# Patient Record
Sex: FEMALE | Race: WHITE
[De-identification: names, ages, dates, MRNs, and addresses within clinical notes are randomized per-mention and may not be internally consistent; named-entity substitution may affect disease eponyms.]

---

## 2018-01-24 ENCOUNTER — HOSPITAL ENCOUNTER (EMERGENCY)
Dept: HOSPITAL 89 - ER | Age: 63
Discharge: HOME | End: 2018-01-24
Payer: SELF-PAY

## 2018-01-24 VITALS — SYSTOLIC BLOOD PRESSURE: 122 MMHG | DIASTOLIC BLOOD PRESSURE: 70 MMHG

## 2018-01-24 DIAGNOSIS — E11.9: ICD-10-CM

## 2018-01-24 DIAGNOSIS — M94.0: Primary | ICD-10-CM

## 2018-01-24 LAB — PLATELET COUNT, AUTOMATED: 222 K/UL (ref 150–450)

## 2018-01-24 PROCEDURE — 71046 X-RAY EXAM CHEST 2 VIEWS: CPT

## 2018-01-24 PROCEDURE — 82435 ASSAY OF BLOOD CHLORIDE: CPT

## 2018-01-24 PROCEDURE — 84450 TRANSFERASE (AST) (SGOT): CPT

## 2018-01-24 PROCEDURE — 84460 ALANINE AMINO (ALT) (SGPT): CPT

## 2018-01-24 PROCEDURE — 96361 HYDRATE IV INFUSION ADD-ON: CPT

## 2018-01-24 PROCEDURE — 82374 ASSAY BLOOD CARBON DIOXIDE: CPT

## 2018-01-24 PROCEDURE — 84155 ASSAY OF PROTEIN SERUM: CPT

## 2018-01-24 PROCEDURE — 82565 ASSAY OF CREATININE: CPT

## 2018-01-24 PROCEDURE — 82947 ASSAY GLUCOSE BLOOD QUANT: CPT

## 2018-01-24 PROCEDURE — 99284 EMERGENCY DEPT VISIT MOD MDM: CPT

## 2018-01-24 PROCEDURE — 82247 BILIRUBIN TOTAL: CPT

## 2018-01-24 PROCEDURE — 93005 ELECTROCARDIOGRAM TRACING: CPT

## 2018-01-24 PROCEDURE — 84132 ASSAY OF SERUM POTASSIUM: CPT

## 2018-01-24 PROCEDURE — 71275 CT ANGIOGRAPHY CHEST: CPT

## 2018-01-24 PROCEDURE — 85025 COMPLETE CBC W/AUTO DIFF WBC: CPT

## 2018-01-24 PROCEDURE — 85379 FIBRIN DEGRADATION QUANT: CPT

## 2018-01-24 PROCEDURE — 82040 ASSAY OF SERUM ALBUMIN: CPT

## 2018-01-24 PROCEDURE — 96374 THER/PROPH/DIAG INJ IV PUSH: CPT

## 2018-01-24 PROCEDURE — 84075 ASSAY ALKALINE PHOSPHATASE: CPT

## 2018-01-24 PROCEDURE — 82310 ASSAY OF CALCIUM: CPT

## 2018-01-24 PROCEDURE — 84520 ASSAY OF UREA NITROGEN: CPT

## 2018-01-24 PROCEDURE — 84295 ASSAY OF SERUM SODIUM: CPT

## 2018-01-24 PROCEDURE — 36416 COLLJ CAPILLARY BLOOD SPEC: CPT

## 2018-01-24 PROCEDURE — 84484 ASSAY OF TROPONIN QUANT: CPT

## 2018-01-24 PROCEDURE — 82948 REAGENT STRIP/BLOOD GLUCOSE: CPT

## 2018-01-24 NOTE — RADIOLOGY IMAGING REPORT
[place] [facility] [place] 

 

PATIENT NAME: Rosalia King

: 1955

MR: [id number]

V: [id number]

EXAM DATE: 

ORDERING PHYSICIAN: IBRAHIMA JAQUEZ

TECHNOLOGIST: 

 

Location: [facility] [place]

Patient: Rosalia King

: 1955

MRN: [id number]

Visit/Account:[id number]

Date of Sevice:  2018

 

ACCESSION #: [id number]

 

CHEST PA AND LAT

 

INDICATION: Chest pain

 

COMPARISON: 2017 radiograph and CT 2017, abdomen and pelvis CT 2017

 

FINDINGS:   The cardiac silhouette is normal in size. No pneumothorax. Unchanged right hemidiaphragm 
elevation. No pleural fluid seen on lateral view. Small curvilinear new opacity projects in the left 
lower lobe seen on frontal and lateral views.. Additional ill-defined 1.9 cm patchy opacity at the le
ft lateral lung base is in region of apparent rounded atelectasis seen on the comparison abdomen and 
pelvis CT. This appears increased in size and conspicuity compared to prior 2017 radiograph.

 

No acute osseous abnormality.

 

IMPRESSION:

1. New curvilinear left basilar scarring versus subsegmental atelectasis.

 

2. 1.9 cm vague left lateral basilar opacity has increased in conspicuity compared to prior chest rad
iograph. This may be increased in size compared to the abdomen and pelvis CT from 2017 allowing 
for technique differences which showed potential rounded atelectasis in the left lower lung.

 

A follow-up chest CT may be warranted to ensure this represents rounded atelectasis and exclude the p
ossibility of a suspicious pulmonary nodule in this area given change in radiographic appearance of t
his finding.

 

Report Dictated By: Parrish Mueller MD at 2018 4:15 PM

 

Report E-Signed By: Parrish Mueller MD  at 2018 4:23 PM

 

WSN:[id number]

## 2018-01-24 NOTE — ER REPORT
History and Physical


Time Seen By MD:  15:11


Hx. of Stated Complaint:  


Pt reporting sudden chest pain that occured at rest two nights ago.  Patient 


reporting stabbing left chest pain.


HPI/ROS


CHIEF COMPLAINT: Left-sided chest pain





HISTORY OF PRESENT ILLNESS: 63-year-old female patient presents to emergency 

room with complaint of left-sided chest pain. Patient states this been going on 

for the past 2 days. She states that the pain seems to be present all the time. 

She states there is nothing seems to make the pain worse. She states that she 

has had some improvement in discomfort with standing. States she also has 

discomfort to the right side of the abdomen where she has drains are placed for 

2 fistulas. She denies having any fevers, chills, nausea, vomiting or diarrhea. 

She states she is taken some oxycodone for this with no improvement in her 

comfort. She states she does have an appointment with the surgeon, who placed 

the drains, tomorrow in [place].





REVIEW OF SYSTEMS:


Respiratory: No cough, no dyspnea.


Cardiovascular: As noted above


Gastrointestinal: No vomiting, no abdominal pain.


Musculoskeletal: No back pain.


Allergies:  


Coded Allergies:  


     No Known Drug Allergies (Unverified , 11/7/17)


Home Meds


Active Scripts


Oxycodone HCl (Oxaydo) 5 Mg Tablet.orl, 1 TAB PO Q4-6H Y for PAIN, #12 TAB


   Prov:IBRAHIMA JAQUEZ [facility]         1/24/18


Meclizine Hcl (MECLIZINE HCL) 25 Mg Tablet, 25 MG PO Q6H Y for DIZZINESS, #30 

TAB


   Prov:IBRAHIMA JAQUEZ [facility]         1/14/17


Reported Medications


Omeprazole (OMEPRAZOLE) 20 Mg Tablet.dr, 20 MG PO BID, TAB


   1/24/18


Atenolol (ATENOLOL) 50 Mg Tablet, 1 TAB PO BID, TAB


   1/24/18


Cetirizine HCl (24Hour Allergy) 10 Mg Tablet


   11/7/17


Simvastatin (SIMVASTATIN) 10 Mg Tablet, 10 MG PO HS, TAB


   3/18/17


Famotidine (FAMOTIDINE) 20 Mg Tablet, 20 MG PO QHS, TAB


   3/18/17


Citalopram Hydrobromide (CITALOPRAM HBR) 20 Mg Tablet, 20 MG PO QDAY, #5 TAB


   3/18/17


Lidocaine HCl (Aspercreme) 4 % Cream..g., TOP


   3/18/17


Acetaminophen (ACETAMINOPHEN) 500 Mg/5 Ml Liquid, 1000 MG PO Q4-6H, #240 ML


   3/18/17


Lisinopril (LISINOPRIL) 20 Mg Tablet, 40 PO QDAY, TAB


   10/7/15


Aspirin (ASPIRIN) 81 Mg Tab.chew, 81 MG PO QDAY, TAB.CHEW


   TAKE 1 TABLET BY MOUTH EVERY DAY


   10/2/14


Metformin Hcl (METFORMIN HCL) 1,000 Mg Tablet, 1 TAB PO BID


   TAKE ONE TABLET BY MOUTH TWO TIMES A DAY


   10/2/14


Buspirone Hcl (BUSPIRONE HCL) 15 Mg Tablet, 15 MG PO BID, #10 TAB


   TAKE 1 TABLET BY MOUTH TWICE A DAY


   10/2/14


Discontinued Reported Medications


 Amoxicillin 500 Mg Tab (AMOXICILLIN 500 MG TAB) 500 Mg Tablet, 1 TAB PO Q8H, 

TAB


   11/7/17


Calcium Carbonate (TUMS) 200 Mg Tab.chew, 200 MG PO, TAB.CHEW


   3/18/17


Atenolol (ATENOLOL) 100 Mg Tablet, 1 TAB PO QDAY, TAB


   3/18/17


Pantoprazole Sodium (PANTOPRAZOLE SODIUM) 40 Mg Tablet.dr, 40 MG PO BID, TAB.SR


   3/18/17


Pregabalin (LYRICA) 100 Mg Capsule, 100 MG PO BID, CAPSULE


   3/18/17


Oxycodone Hcl (OXYCONTIN) 10 Mg Tab.er.12h, 5 MG PO, TAB


   3/18/17


Chlorpheniramine Maleate (CHLOR-TRIMETON ALLERGY) 12 Mg Tablet.er, 4 MG PO DAILY


   10/2/14


Atenolol (ATENOLOL) 100 Mg Tablet, 1 TAB PO QDAY, TAB


   TAKE ONE TABLET BY MOUTH ONCE A DAY


   10/2/14


Past Medical/Surgical History


Patient has a past medical history of hypertension, hyperlipidemia, pneumonia, 

hiatal hernia, frequent UTI, ankle fracture, back pain, type 2 diabetes, anemia.


Patient has a surgical history of mass removal, fistula, cholecystectomy, 

hysterectomy, wisdom teeth surgery.


Reviewed Nurses Notes:  Yes


Hx Smoking:  No


Smoking Status:  Never Smoker


Exposure to Second Hand Smoke?:  No


Constitutional





Vital Sign - Last 24 Hours








 1/24/18 1/24/18 1/24/18 1/24/18





 15:05 15:05 15:05 15:06


 


Temp  98.7  


 


Pulse  73  


 


Resp  18  


 


B/P (MAP)  124/65 124/65 (84) 


 


Pulse Ox  90  89


 


O2 Delivery  Nasal Cannula  


 


O2 Flow Rate 1.0   


 


    





 1/24/18 1/24/18 1/24/18 1/24/18





 15:11 15:16 15:21 15:26


 


Pulse 71 71 71 69


 


Pulse Ox 91 94 93 93





 1/24/18 1/24/18 1/24/18 1/24/18





 15:30 15:31 15:36 15:41


 


Pulse  69 70 68


 


B/P (MAP) ???/??? (1665)   


 


Pulse Ox  94 91 93





 1/24/18 1/24/18 1/24/18 1/24/18





 15:42 15:46 15:51 15:56


 


Pulse  68 66 67


 


Resp  23 19 18


 


B/P (MAP) 115/63 (80)   


 


Pulse Ox  93 94 94





 1/24/18 1/24/18 1/24/18 1/24/18





 16:08 16:11 16:16 16:26


 


Pulse  69 67 67


 


Resp  19 22 19


 


B/P (MAP) 124/67 (86)   


 


Pulse Ox  94 94 94





 1/24/18 1/24/18 1/24/18 1/24/18





 16:36 16:41 16:51 16:56


 


Pulse 71 69 73 69


 


Resp 20 20 16 31


 


Pulse Ox 94 94 94 





 1/24/18 1/24/18 1/24/18 1/24/18





 17:01 17:06 17:11 17:16


 


Pulse 68 69 67 68


 


Resp 30 22 22 13





 1/24/18 1/24/18 1/24/18 1/24/18





 17:21 17:26 18:00 18:06


 


Pulse 66 69  76


 


Resp 21 28  26


 


B/P (MAP)   116/89 (98) 


 


Pulse Ox    91





 1/24/18 1/24/18 1/24/18 1/24/18





 18:11 18:16 18:21 18:26


 


Pulse 70 69 68 68


 


Resp 22 21 23 23


 


Pulse Ox 92 92 93 92





 1/24/18 1/24/18 1/24/18 1/24/18





 18:30 18:31 18:36 18:41


 


Pulse  72 69 71


 


Resp  21 20 23


 


B/P (MAP) 103/54 (70)   


 


Pulse Ox  93 90 90





 1/24/18 1/24/18 1/24/18 1/24/18





 18:46 18:51 18:56 19:00


 


Pulse 72 68 71 


 


Resp 24 19 34 


 


B/P (MAP)    122/70 (87)


 


Pulse Ox 91 92 92 





 1/24/18 1/24/18 1/24/18 





 19:01 19:06 19:11 


 


Pulse 80 73 71 


 


Resp 17  16 


 


Pulse Ox 93  94 














Intake and Output   


 


 1/24/18 1/24/18 1/25/18





 15:00 23:00 07:00


 


Intake Total  500 ml 


 


Balance  500 ml 








Physical Exam


  General Appearance: The patient is alert, has no immediate need for airway 

protection and no current signs of toxicity.


Respiratory: Chest is tender on the left side of the chest, lungs are clear to 

auscultation.


Cardiac: regular rate and rhythm


Gastrointestinal: Abdomen is soft and non tender, no masses, bowel sounds 

normal.


Musculoskeletal:  Neck: Neck is supple and non tender.


   Extremities have full range of motion and are non tender.


Skin: No rashes or lesions.





DIFFERENTIAL DIAGNOSIS: After history and physical exam differential diagnosis 

was considered for chest pain including but not limited to myocardial ischemia, 

pericarditis pulmonary embolus, chest wall pain, pleural inflammation and 

pulmonary infectious causes.





Medical Decision Making


Data Points


Result Diagram:  


1/24/18 1540                                                                   

             1/24/18 1540





Laboratory





Hematology








Test


  1/24/18


15:40 1/24/18


17:49


 


Red Blood Count


  4.38 M/uL


(4.17-5.56) 


 


 


Mean Corpuscular Volume


  81.9 fL


(80.0-96.0) 


 


 


Mean Corpuscular Hemoglobin


  26.7 pg


(26.0-33.0) 


 


 


Mean Corpuscular Hemoglobin


Concent 32.6 g/dL


(32.0-36.0) 


 


 


Red Cell Distribution Width


  15.8 %


(11.5-14.5) 


 


 


Mean Platelet Volume


  8.5 fL


(7.2-11.1) 


 


 


Neutrophils (%) (Auto)


  63.2 %


(39.4-72.5) 


 


 


Lymphocytes (%) (Auto)


  25.6 %


(17.6-49.6) 


 


 


Monocytes (%) (Auto)


  8.2 %


(4.1-12.4) 


 


 


Eosinophils (%) (Auto)


  2.3 %


(0.4-6.7) 


 


 


Basophils (%) (Auto)


  0.7 %


(0.3-1.4) 


 


 


Nucleated RBC Relative Count


(auto) 0.0 /100WBC 


  


 


 


Neutrophils # (Auto)


  4.7 K/uL


(2.0-7.4) 


 


 


Lymphocytes # (Auto)


  1.9 K/uL


(1.3-3.6) 


 


 


Monocytes # (Auto)


  0.6 K/uL


(0.3-1.0) 


 


 


Eosinophils # (Auto)


  0.2 K/uL


(0.0-0.5) 


 


 


Basophils # (Auto)


  0.1 K/uL


(0.0-0.1) 


 


 


Nucleated RBC Absolute Count


(auto) 0.00 K/uL 


  


 


 


D-Dimer Quantitative (PE/DVT)


  6.08 ug/ml


(0-0.50) 


 


 


Sodium Level


  137 mmol/L


(137-145) 


 


 


Potassium Level


  5.8 mmol/L


(3.5-5.0) 


 


 


Chloride Level


  105 mmol/L


() 


 


 


Carbon Dioxide Level


  23 mmol/L


(22-31) 


 


 


Blood Urea Nitrogen


  36 mg/dl


(7-18) 


 


 


Creatinine


  0.80 mg/dl


(0.52-1.04) 


 


 


Glomerular Filtration Rate


Calc > 60.0 


  


 


 


Random Glucose


  117 mg/dl


() 


 


 


Calcium Level


  11.5 mg/dl


(8.4-10.2) 


 


 


Total Bilirubin


  0.4 mg/dl


(0.2-1.3) 


 


 


Aspartate Amino Transf


(AST/SGOT) 63 U/L (0-35) 


  


 


 


Alanine Aminotransferase


(ALT/SGPT) 87 U/L (0-56) 


  


 


 


Alkaline Phosphatase


  223 U/L


(0-126) 


 


 


Troponin I < 0.012 ng/ml  


 


Total Protein


  7.4 gm/dl


(6.3-8.2) 


 


 


Albumin


  3.7 g/dl


(3.5-5.0) 


 


 


Whole Blood Glucose


  


  100 mg/DL


()








Chemistry








Test


  1/24/18


15:40 1/24/18


17:49


 


White Blood Count


  7.4 k/uL


(4.5-11.0) 


 


 


Red Blood Count


  4.38 M/uL


(4.17-5.56) 


 


 


Hemoglobin


  11.7 g/dL


(12.0-16.0) 


 


 


Hematocrit


  35.9 %


(34.0-47.0) 


 


 


Mean Corpuscular Volume


  81.9 fL


(80.0-96.0) 


 


 


Mean Corpuscular Hemoglobin


  26.7 pg


(26.0-33.0) 


 


 


Mean Corpuscular Hemoglobin


Concent 32.6 g/dL


(32.0-36.0) 


 


 


Red Cell Distribution Width


  15.8 %


(11.5-14.5) 


 


 


Platelet Count


  222 K/uL


(150-450) 


 


 


Mean Platelet Volume


  8.5 fL


(7.2-11.1) 


 


 


Neutrophils (%) (Auto)


  63.2 %


(39.4-72.5) 


 


 


Lymphocytes (%) (Auto)


  25.6 %


(17.6-49.6) 


 


 


Monocytes (%) (Auto)


  8.2 %


(4.1-12.4) 


 


 


Eosinophils (%) (Auto)


  2.3 %


(0.4-6.7) 


 


 


Basophils (%) (Auto)


  0.7 %


(0.3-1.4) 


 


 


Nucleated RBC Relative Count


(auto) 0.0 /100WBC 


  


 


 


Neutrophils # (Auto)


  4.7 K/uL


(2.0-7.4) 


 


 


Lymphocytes # (Auto)


  1.9 K/uL


(1.3-3.6) 


 


 


Monocytes # (Auto)


  0.6 K/uL


(0.3-1.0) 


 


 


Eosinophils # (Auto)


  0.2 K/uL


(0.0-0.5) 


 


 


Basophils # (Auto)


  0.1 K/uL


(0.0-0.1) 


 


 


Nucleated RBC Absolute Count


(auto) 0.00 K/uL 


  


 


 


D-Dimer Quantitative (PE/DVT)


  6.08 ug/ml


(0-0.50) 


 


 


Glomerular Filtration Rate


Calc > 60.0 


  


 


 


Calcium Level


  11.5 mg/dl


(8.4-10.2) 


 


 


Total Bilirubin


  0.4 mg/dl


(0.2-1.3) 


 


 


Aspartate Amino Transf


(AST/SGOT) 63 U/L (0-35) 


  


 


 


Alanine Aminotransferase


(ALT/SGPT) 87 U/L (0-56) 


  


 


 


Alkaline Phosphatase


  223 U/L


(0-126) 


 


 


Troponin I < 0.012 ng/ml  


 


Total Protein


  7.4 gm/dl


(6.3-8.2) 


 


 


Albumin


  3.7 g/dl


(3.5-5.0) 


 


 


Whole Blood Glucose


  


  100 mg/DL


()








Coagulation








Test


  1/24/18


15:40


 


D-Dimer Quantitative (PE/DVT) 6.08 ug/ml 











EKG/Imaging


EKG Interpretation


12 lead EKG:


      Rhythm: normal sinus rhythm with a ventricular rate of 70 bpm.


      Axis: normal 


      QRS: normal


      ST segments: normal


Imaging


EXAMINATION: CT CHEST PULMONARY ANGIOGRAM


 


COMPARISON: Chest x-ray same day and earlier. Chest CT 1/14/2017. Abdomen CT 11/ 7/2017.


 


HISTORY: Chest and back pain. Possible enlarging lung nodule.


 


PROCEDURE: Pulmonary arterial phase imaging of the chest with 75 mL intravenous 

Isovue 370. Reconstruction of the source data set includes multiplanar 2D in 

the sagittal and coronal planes, and 3D reconstructed coronal slab MIP series.


 


One of the following dose optimization techniques was utilized in the 

performance of this exam: Automated exposure control; adjustment of the mA and/

or kV according to the patient's size; or use of an iterative  reconstruction 

technique.  Specific details can be referenced in the facility's radiology CT 

exam operational policy.


 


FINDINGS:


Pulmonary vasculature: There is good contrast opacification of the pulmonary 

arterial system.  No pulmonary embolism. Main pulmonary artery size is normal.


 


Cardiac and mediastinum: Cardiac chamber size is within normal limits. No 

pericardial effusion. Probable mild coronary calcifications. No thoracic aortic 

aneurysm or dissection. No thoracic lymph node enlargement. Probable 

multinodular thyroid.


 


Lungs and pleura: Low lung volumes. Trace left pleural effusion. Left lung base 

increased density is favored to be due to atelectasis. The previously described 

region of rounded atelectasis is partially obscured by the surrounding regions 

of segmental atelectasis. No definite new or enlarging consolidation or nodule 

is otherwise identified. No pneumothorax or edema.


 


Airways: Airways are patent. Mild tracheobronchomalacia.


 


Upper abdomen: Mild gastric distention in the visualized upper abdomen.


 


Osseous structures: No acute changes.


 


IMPRESSION:


 


1. No pulmonary embolism.


2. Trace left pleural effusion with increased left lung base atelectasis.


3. No findings of acute cardiopulmonary disease are otherwise identified.


 


Report Dictated By: Simon Graves MD at 1/24/2018 6:29 PM


 


Report E-Signed By: Simon Graves MD  at 1/24/2018 6:41 PM





CHEST PA AND LAT


 


INDICATION: Chest pain


 


COMPARISON: 11/7/2017 radiograph and CT January 14, 2017, abdomen and pelvis CT 

11/7/2017


 


FINDINGS:   The cardiac silhouette is normal in size. No pneumothorax. 

Unchanged right hemidiaphragm elevation. No pleural fluid seen on lateral view. 

Small curvilinear new opacity projects in the left lower lobe seen on frontal 

and lateral views.. Additional ill-defined 1.9 cm patchy opacity at the left 

lateral lung base is in region of apparent rounded atelectasis seen on the 

comparison abdomen and pelvis CT. This appears increased in size and 

conspicuity compared to prior 11/7/2017 radiograph.


 


No acute osseous abnormality.


 


IMPRESSION:


1. New curvilinear left basilar scarring versus subsegmental atelectasis.


 


2. 1.9 cm vague left lateral basilar opacity has increased in conspicuity 

compared to prior chest radiograph. This may be increased in size compared to 

the abdomen and pelvis CT from 11/7/2017 allowing for technique differences 

which showed potential rounded atelectasis in the left lower lung.


 


A follow-up chest CT may be warranted to ensure this represents rounded 

atelectasis and exclude the possibility of a suspicious pulmonary nodule in 

this area given change in radiographic appearance of this finding.


 


Report Dictated By: Parrish Mueller MD at 1/24/2018 4:15 PM


 


Report E-Signed By: Parrish Mueller MD  at 1/24/2018 4:23 PM





ED Course/Re-evaluation


ED Course


Patient was medicated exam room, history and physical were obtained. 

Differential diagnoses were considered. On examination patient did have some 

tenderness to the left side of her chest. An EKG, troponin, chest x-ray, CBC, 

CMP, d-dimer were done. EKG showed a normal sinus rhythm, chest x-ray showed a 

nodule which appeared to be larger than when he was previously seen in an 

abdomen and pelvis CT scan. Recommended a CT scan for further evaluation. CBC 

was unremarkable, patient did have a d-dimer that was 6. Patient had an 

elevated potassium of 5.8. A CT pulmonary angiogram was done. The results were 

negative for any acute findings, as well as pulmonary emboli. I discussed 

findings with the patient and her . I believe the patient is likely 

having chest wall pain, likely a costochondritis as result of movement guarding 

the drain which she has for her fistulas. Patient does have an appointment 

tomorrow with her surgeon for follow-up. We will go ahead and discharge patient 

home at this time. She is follow-up with her surgeon. She is return to the 

emergency room if condition worsens. Patient verbalized understanding and 

agreement.


Decision to Disposition Date:  Jan 24, 2018


Decision to Disposition Time:  19:02





Depart


Departure


Latest Vital Signs





Vital Signs








  Date Time  Temp Pulse Resp B/P (MAP) Pulse Ox O2 Delivery O2 Flow Rate FiO2


 


1/24/18 19:11  71 16  94   


 


1/24/18 19:00    122/70 (87)    


 


1/24/18 15:05 98.7     Nasal Cannula  


 


1/24/18 15:05       1.0 








Impression:  


 Primary Impression:  


 Chest pain


 Additional Impression:  


 Costochondritis, acute


Condition:  Improved


Disposition:  HOME OR SELF-CARE


Referrals:  


BETHANIE SIMMONS MD (PCP)


New Scripts


Oxycodone HCl (Oxaydo) 5 Mg Tablet.orl


1 TAB PO Q4-6H Y for PAIN, #12 TAB


   Prov: IBRAHIMA JAQUEZ         1/24/18


Patient Instructions:  Chest Pain (ED)





Additional Instructions:  


Limit activity by pain.


Follow up with your surgeon tomorrow as scheduled.


Limit activity by pain.


Take Ibuprofen as needed for pain in addition to the pain medication.


Increase fluid intake.


Return to the ER if condition worsens.





Problem Qualifiers








 Primary Impression:  


 Chest pain


 Chest pain type:  other chest pain  Qualified Codes:  R07.89 - Other chest pain








IBRAHIMA JAQUEZ Jan 24, 2018 15:11

## 2018-01-24 NOTE — RADIOLOGY IMAGING REPORT
FACILITY: [facility] [place] 

 

PATIENT NAME: Rosalia King

: 1955

MR: [id number]

V: [id number]

EXAM DATE: 

ORDERING PHYSICIAN: IBRAHIMA JAQUEZ

TECHNOLOGIST: 

 

[place] [facility] [place]

Patient: Rosalia King

: 1955

MRN: [id number]

Visit/Account:[id number]

Date of Sevice:  2018

 

ACCESSION #: [id number]

 

EXAMINATION: CT CHEST PULMONARY ANGIOGRAM

 

COMPARISON: Chest x-ray same day and earlier. Chest CT 2017. Abdomen CT 2017.

 

HISTORY: Chest and back pain. Possible enlarging lung nodule.

 

PROCEDURE: Pulmonary arterial phase imaging of the chest with 75 mL intravenous Isovue 370. Reconstru
ction of the source data set includes multiplanar 2D in the sagittal and coronal planes, and 3D recon
structed coronal slab MIP series.

 

One of the following dose optimization techniques was utilized in the performance of this exam: Autom
ated exposure control; adjustment of the mA and/or kV according to the patient's size; or use of an i
terative  reconstruction technique.  Specific details can be referenced in the facility's radiology C
T exam operational policy.

 

FINDINGS:

Pulmonary vasculature: There is good contrast opacification of the pulmonary arterial system.  No pul
monary embolism. Main pulmonary artery size is normal.

 

Cardiac and mediastinum: Cardiac chamber size is within normal limits. No pericardial effusion. Proba
ble mild coronary calcifications. No thoracic aortic aneurysm or dissection. No thoracic lymph node e
nlargement. Probable multinodular thyroid.

 

Lungs and pleura: Low lung volumes. Trace left pleural effusion. Left lung base increased density is 
favored to be due to atelectasis. The previously described region of rounded atelectasis is partially
 obscured by the surrounding regions of segmental atelectasis. No definite new or enlarging consolida
tion or nodule is otherwise identified. No pneumothorax or edema.

 

Airways: Airways are patent. Mild tracheobronchomalacia.

 

Upper abdomen: Mild gastric distention in the visualized upper abdomen.

 

Osseous structures: No acute changes.

 

IMPRESSION:

 

1. No pulmonary embolism.

2. Trace left pleural effusion with increased left lung base atelectasis.

3. No findings of acute cardiopulmonary disease are otherwise identified.

 

Report Dictated By: Simon Graves MD at 2018 6:29 PM

 

Report E-Signed By: Simon Graves MD  at 2018 6:41 PM

 

WSN:[id number]

## 2018-01-24 NOTE — EKG
FACILITY: [facility] 

 

PATIENT NAME: SLADE AGUILAR

: [id number]

MR: [id number]

V: [id number]

EXAM DATE: 

ORDERING PHYSICIAN: IBRAHIMA JAQUEZ

TECHNOLOGIST: TIP

 

Test Reason : CP

Blood Pressure : ***/*** mmHG

Vent. Rate : 070 BPM     Atrial Rate : 070 BPM

   P-R Int : 136 ms          QRS Dur : 076 ms

    QT Int : 378 ms       P-R-T Axes : 016 -01 019 degrees

   QTc Int : 408 ms

 

Normal sinus rhythm

Voltage criteria for left ventricular hypertrophy

Abnormal ECG

When compared with ECG of 2017 08:47,

Previous ECG has undetermined rhythm, needs review

Confirmed by NILES RODARTE ([id number]) on 2018 5:32:29 PM

 

Referred By:  BISHOP           Confirmed By:NILES RODARTE

## 2018-04-12 ENCOUNTER — HOSPITAL ENCOUNTER (OUTPATIENT)
Dept: HOSPITAL 89 - AMB | Age: 63
End: 2018-04-12
Payer: SELF-PAY

## 2018-04-12 ENCOUNTER — HOSPITAL ENCOUNTER (EMERGENCY)
Dept: HOSPITAL 89 - ER | Age: 63
Discharge: ANOTHER HOSPITAL | End: 2018-04-12
Payer: SELF-PAY

## 2018-04-12 VITALS — DIASTOLIC BLOOD PRESSURE: 70 MMHG | SYSTOLIC BLOOD PRESSURE: 140 MMHG

## 2018-04-12 DIAGNOSIS — R10.9: Primary | ICD-10-CM

## 2018-04-12 DIAGNOSIS — T81.4XXA: ICD-10-CM

## 2018-04-12 DIAGNOSIS — L02.211: Primary | ICD-10-CM

## 2018-04-12 LAB
INR PPP: 0.98
PLATELET COUNT, AUTOMATED: 175 K/UL (ref 150–450)

## 2018-04-12 PROCEDURE — 87040 BLOOD CULTURE FOR BACTERIA: CPT

## 2018-04-12 PROCEDURE — 96365 THER/PROPH/DIAG IV INF INIT: CPT

## 2018-04-12 PROCEDURE — 71046 X-RAY EXAM CHEST 2 VIEWS: CPT

## 2018-04-12 PROCEDURE — 74177 CT ABD & PELVIS W/CONTRAST: CPT

## 2018-04-12 PROCEDURE — 85610 PROTHROMBIN TIME: CPT

## 2018-04-12 PROCEDURE — 84075 ASSAY ALKALINE PHOSPHATASE: CPT

## 2018-04-12 PROCEDURE — 84295 ASSAY OF SERUM SODIUM: CPT

## 2018-04-12 PROCEDURE — 83690 ASSAY OF LIPASE: CPT

## 2018-04-12 PROCEDURE — 84520 ASSAY OF UREA NITROGEN: CPT

## 2018-04-12 PROCEDURE — 84155 ASSAY OF PROTEIN SERUM: CPT

## 2018-04-12 PROCEDURE — 82040 ASSAY OF SERUM ALBUMIN: CPT

## 2018-04-12 PROCEDURE — 82948 REAGENT STRIP/BLOOD GLUCOSE: CPT

## 2018-04-12 PROCEDURE — 85730 THROMBOPLASTIN TIME PARTIAL: CPT

## 2018-04-12 PROCEDURE — 82435 ASSAY OF BLOOD CHLORIDE: CPT

## 2018-04-12 PROCEDURE — 96375 TX/PRO/DX INJ NEW DRUG ADDON: CPT

## 2018-04-12 PROCEDURE — 82247 BILIRUBIN TOTAL: CPT

## 2018-04-12 PROCEDURE — 82310 ASSAY OF CALCIUM: CPT

## 2018-04-12 PROCEDURE — 81001 URINALYSIS AUTO W/SCOPE: CPT

## 2018-04-12 PROCEDURE — 99284 EMERGENCY DEPT VISIT MOD MDM: CPT

## 2018-04-12 PROCEDURE — 82947 ASSAY GLUCOSE BLOOD QUANT: CPT

## 2018-04-12 PROCEDURE — 36416 COLLJ CAPILLARY BLOOD SPEC: CPT

## 2018-04-12 PROCEDURE — 96361 HYDRATE IV INFUSION ADD-ON: CPT

## 2018-04-12 PROCEDURE — 82565 ASSAY OF CREATININE: CPT

## 2018-04-12 PROCEDURE — 84460 ALANINE AMINO (ALT) (SGPT): CPT

## 2018-04-12 PROCEDURE — 85025 COMPLETE CBC W/AUTO DIFF WBC: CPT

## 2018-04-12 PROCEDURE — 82374 ASSAY BLOOD CARBON DIOXIDE: CPT

## 2018-04-12 PROCEDURE — 84132 ASSAY OF SERUM POTASSIUM: CPT

## 2018-04-12 PROCEDURE — 80320 DRUG SCREEN QUANTALCOHOLS: CPT

## 2018-04-12 PROCEDURE — 84450 TRANSFERASE (AST) (SGOT): CPT

## 2018-04-12 NOTE — RADIOLOGY IMAGING REPORT
FACILITY: [facility] 

 

PATIENT NAME: Rosalia King

: 1955

MR: [id number]

V: [id number]

EXAM DATE: 

ORDERING PHYSICIAN: PHI MONTEZ

TECHNOLOGIST: 

 

Location: [facility] [place]

Patient: Rosalia King

: 1955

MRN: [id number]

Visit/Account:[id number]

Date of Sevice:  2018

 

ACCESSION #: [id number]

 

ABDOMEN/PELVIS WITH CONTRAST

 

HISTORY:  Pain, infection

 

TECHNIQUE: Following administration of IV contrast contiguous axial images acquired through the abdom
en/pelvis.  Coronal and sagittal reformatting also performed. Dose Lowering Technique

 

One of the following dose optimization techniques was utilized in the performance of this exam: Autom
ated exposure control; adjustment of the mA and/or kV according to the patient's size; or use of an i
terative  reconstruction technique.  Specific details can be referenced in the facility's radiology C
T exam operational policy.

 

 

 

CONTRAST:  75 mL Isovue-370

 

COMPARISON:  2017

 

FINDINGS:

 

Visualized lung bases:  Small calcified granuloma right upper lobe.  Pleural thickening and linear sc
arring in the left lower lobe appears similar to the prior study.  Previously noted round subpleural 
density in the posterior lateral left lower lobe minimally decreased in size now measuring 1.6 cm as 
opposed 1.7 cm.

 

 

Hepatobiliary:  Nodular appearance to the hepatic surface consistent with cirrhosis.  Postsurgical ch
anges from a cholecystectomy

 

 

Spleen:  Splenomegaly Spleen measures 14.8 cm in length.  There is a tiny accessory splenule

 

Adrenals:  Negative.

 

Pancreas:  Tiny calcifications in the head of the pancreas

 

Kidneys ureters or bladder: 3 mm nonobstructing calculus lower pole calyx of the left kidney

 

Genitalia:  Hysterectomy

 

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  The afferent 
and efferent  loops do not appear dilated.  There is a small collection of gas projecting just medial
 to the anterior portion of the gastric fundus tracking towards the proximal portion of the afferent 
loop.  There is a percutaneous drainage catheter extending from the anterior lateral right-sided abdo
blayne wall distal tip terminating just anterior to the right lobe the liver.  There does appear to be
 a small soft tissue tract extending towards this air collection from the distal tip of the drainage 
catheter.  The previously noted thick-walled intermediate density fluid collection containing small f
ocus of gas along the anterior right abdominal wall appears mostly resolved.  Previously described ha
zy mesentery surrounding several loops of small bowel in the left mid to upper abdomen has resolved. 
 There is diastases of the rectus sheath with anterior protrusion of nonobstructed large and small angela
wel.  There are Two small ventral hernias in the midline of the upper abdomen containing knuckles of 
nonobstructed transverse colon.

 

Vessels/spaces/nodes:  There are numerous small celiac lymph nodes similar to the prior study which a
re likely reactive.  There are multiple retroperitoneal lymph nodes also appear similar to the prior 
study.  A representative aortocaval lymph node measures 1.8 x 1 cm

 

Bones/soft tissues:  Please see above discussion no aggressive appearing bone lesions are seen

 

Additional findings:  None pertinent.

 

IMPRESSION:

 

There are postsurgical changes from partial gastric resection with a Billroth II reconstruction.  The
re is a small collection of gas projecting just medial to the anterior portion of the gastric fundus 
tracking towards the proximal portion of the afferent loop.  There is a percutaneous drainage cathete
r extending from the anterior right lateral abdominal wall with the distal tip terminating just anter
ior to the right lobe the liver.  There appears to be a soft tissue tract extending towards this air 
collection from the distal tip of the drainage catheter.

 

The previous thick-walled intermediate density fluid collection containing air along the anterior rig
ht abdominal wall appears mostly resolved

 

Small vent al hernias containing nonobstructed large bowel

 

Diastases of the rectus sheath with anterior protrusion of the nonobstructed large and small bowel

 

Retroperitoneal adenopathy appears stable

 

SplenomegalyNodular appearance to the hepatic surface consistent with cirrhosis

 

Left basilar lung scarring.

 

Report Dictated By: Stephanie Perrin MD at 2018 11:52 AM

 

Report E-Signed By: Stephanie Perrin MD  at 2018 12:22 PM

 

WSN:[id number]

## 2018-04-12 NOTE — RADIOLOGY IMAGING REPORT
[place] [facility] [place] 

 

PATIENT NAME: Rosalia King

: 1955

MR: [id number]

V: [id number]

EXAM DATE: 

ORDERING PHYSICIAN: PHI MONTEZ

TECHNOLOGIST: 

 

[place] [facility] [place]: Rosalia King

: 1955

MRN: [id number]

Visit/Account:[id number]

Date of Sevice:  2018

 

ACCESSION #: [id number]

 

Exam type: CHEST PA AND LAT

 

History: pain

 

Comparison: 2018.

 

Findings:

 

Again noted is chronic elevation right hemidiaphragm.  Chronic linear stranding left lung base consis
tent with scarring.  There is mild fullness of the pulmonary vascular markings.  Chronic blunting of 
left costophrenic angle.  The cardiac silhouette is normal in size.  There are mild spondylotic churchill
es of the thoracic spine

 

IMPRESSION:

 

1.  Chronic scarring left lung base

 

Prominence of the pulmonary vascular markings which could represent mild pulmonary edema.  Clinical c
orrelation needed

 

Report Dictated By: Stephanie Perrin MD at 2018 11:47 AM

 

Report E-Signed By: Stephanie Perrin MD  at 2018 11:50 AM

 

WSN:ETHEL

## 2018-04-12 NOTE — ER REPORT
History and Physical


Time Seen By MD:  11:20


Hx. of Stated Complaint:  


PT HAS FISTULA DRAIN PUT IN DECEMBER, SKIN HAS BEEN VERY RAW/PAINFUL


HPI/ROS


CHIEF COMPLAINT: Infected drink





HISTORY OF PRESENT ILLNESS: 63 erythema comes emergency Department today with a 

significant past medical and surgical history consistent of a fistula formation 

status post abdominal surgery removal of a benign tumor resulting in a fistula 

which she currently has a drain recently the drain is become purulent with 

abnormal material from the drainage site of the drainage the bag does have some 

material in it but there is seepage drainage and around the site with obvious 

dermatological changes in and around for the 2 places against the abdominal 

wall patient describing pain in that area nausea without vomiting no diarrhea 

denies any fever or chills patient has no additional place this time





REVIEW OF SYSTEMS:


Respiratory: No cough, no dyspnea.


Cardiovascular: No chest pain, no palpitations.


Gastrointestinal: Nausea abdominal pain purulent drainage from fistula site


Musculoskeletal: No back pain.


Remainder of the 14 system rev:  Yes


Allergies:  


Coded Allergies:  


     amoxicillin (Verified  Allergy, Unknown, 4/12/18)


     clavulanic acid (Verified  Allergy, Unknown, 4/12/18)


Home Meds


Active Scripts


Oxycodone HCl (Oxaydo) 5 Mg Tablet.orl, 1 TAB PO Q4-6H Y for PAIN, #12 TAB


   Prov:IBRAHIMA JAQUEZ FNP         1/24/18


Reported Medications


Cholestyramine (CHOLESTYRAMINE RESIN) 5 Gm Powder, 5 GM MC


   4/12/18


Clindamycin Hcl (CLINDAMYCIN HCL) 300 Mg Capsule, 150 MG PO Q6H, #40 CAPSULE


   4/12/18


Omeprazole (OMEPRAZOLE) 20 Mg Tablet.dr, 20 MG PO BID, TAB


   1/24/18


Atenolol (ATENOLOL) 50 Mg Tablet, 1 TAB PO BID, TAB


   1/24/18


Cetirizine HCl (24Hour Allergy) 10 Mg Tablet


   11/7/17


Simvastatin (SIMVASTATIN) 10 Mg Tablet, 10 MG PO HS, TAB


   3/18/17


Famotidine (FAMOTIDINE) 20 Mg Tablet, 20 MG PO QHS, TAB


   3/18/17


Citalopram Hydrobromide (CITALOPRAM HBR) 20 Mg Tablet, 20 MG PO QDAY, #5 TAB


   3/18/17


Lidocaine HCl (Aspercreme) 4 % Cream..g., TOP


   3/18/17


Lisinopril (LISINOPRIL) 20 Mg Tablet, 40 PO QDAY, TAB


   10/7/15


Aspirin (ASPIRIN) 81 Mg Tab.chew, 81 MG PO QDAY, TAB.CHEW


   TAKE 1 TABLET BY MOUTH EVERY DAY


   10/2/14


Metformin Hcl (METFORMIN HCL) 1,000 Mg Tablet, 1 TAB PO BID


   TAKE ONE TABLET BY MOUTH TWO TIMES A DAY


   10/2/14


Buspirone Hcl (BUSPIRONE HCL) 15 Mg Tablet, 15 MG PO BID, #10 TAB


   TAKE 1 TABLET BY MOUTH TWICE A DAY


   10/2/14


Discontinued Reported Medications


Acetaminophen (ACETAMINOPHEN) 500 Mg/5 Ml Liquid, 1000 MG PO Q4-6H, #240 ML


   3/18/17


Discontinued Scripts


Meclizine Hcl (MECLIZINE HCL) 25 Mg Tablet, 25 MG PO Q6H Y for DIZZINESS, #30 

TAB


   Prov:IBRAHIMA JAQUEZ FNNAKUL         1/14/17


Reviewed Nurses Notes:  Yes


Old Medical Records Reviewed:  Yes


Hx Smoking:  No


Smoking Status:  Never Smoker


Exposure to Second Hand Smoke?:  No


Constitutional





Vital Sign - Last 24 Hours








 4/12/18 4/12/18 4/12/18 4/12/18





 10:22 10:23 10:29 10:30


 


Temp  99.6  


 


Pulse  66 65 


 


Resp  16  


 


B/P (MAP) 137/67 (90) 137/67  134/69 (90)


 


Pulse Ox  84 95 


 


O2 Delivery  Room Air  


 


    





 4/12/18 4/12/18 4/12/18 4/12/18





 10:44 10:59 11:00 11:14


 


Pulse 64 63  64


 


B/P (MAP)   120/58 (78) 


 


Pulse Ox 95 95  96





 4/12/18 4/12/18 4/12/18 4/12/18





 11:29 11:30 11:44 11:54


 


Pulse ???  65 


 


B/P (MAP)  ???/??? (1665)  128/61 (83)


 


Pulse Ox   95 





 4/12/18   





 11:55   


 


O2 Flow Rate 2.0   














Intake and Output   


 


 4/12/18 4/12/18 4/13/18





 14:59 22:59 06:59


 


Output Total 25 ml  


 


Balance -25 ml  








Physical Exam


  General Appearance: The patient is alert, has no immediate need for airway 

protection and no current signs of toxicity.  [ ]


Eyes: Pupils equal and round no injection.


Respiratory: Chest is non tender, lungs are clear to auscultation.


Cardiac: regular rate and rhythm [ ]


Gastrointestinal: Abdomen demonstrates a fistula on the right upper abdominal 

area with purulent material drainage or is a significant dermatological rash of 

the contact dermatitis or a candidal infection and around that site approximate 

6 cm x 12 cm in width and length there is material purulent


Musculoskeletal:  Neck: Neck is supple and non tender.


   Extremities have full range of motion and are non tender.


Skin: Rash in the right side abdominal area 4 x 6 x 12 cm in diameter


[ ]


DIFFERENTIAL DIAGNOSIS: After history and physical exam differential diagnosis 

was considered for displaced fistula tube infected tube





Medical Decision Making


Data Points


Result Diagram:  


4/12/18 1042                                                                   

             4/12/18 1042





Laboratory





Hematology








Test


  4/12/18


10:42 4/12/18


11:51


 


Red Blood Count


  4.29 M/uL


(4.17-5.56) 


 


 


Mean Corpuscular Volume


  82.2 fL


(80.0-96.0) 


 


 


Mean Corpuscular Hemoglobin


  26.9 pg


(26.0-33.0) 


 


 


Mean Corpuscular Hemoglobin


Concent 32.8 g/dL


(32.0-36.0) 


 


 


Red Cell Distribution Width


  15.8 %


(11.5-14.5) 


 


 


Mean Platelet Volume


  8.3 fL


(7.2-11.1) 


 


 


Neutrophils (%) (Auto)


  68.3 %


(39.4-72.5) 


 


 


Lymphocytes (%) (Auto)


  22.3 %


(17.6-49.6) 


 


 


Monocytes (%) (Auto)


  9.0 %


(4.1-12.4) 


 


 


Eosinophils (%) (Auto)


  0.2 %


(0.4-6.7) 


 


 


Basophils (%) (Auto)


  0.2 %


(0.3-1.4) 


 


 


Nucleated RBC Relative Count


(auto) 0.1 /100WBC 


  


 


 


Neutrophils # (Auto)


  3.3 K/uL


(2.0-7.4) 


 


 


Lymphocytes # (Auto)


  1.1 K/uL


(1.3-3.6) 


 


 


Monocytes # (Auto)


  0.4 K/uL


(0.3-1.0) 


 


 


Eosinophils # (Auto)


  0.0 K/uL


(0.0-0.5) 


 


 


Basophils # (Auto)


  0.0 K/uL


(0.0-0.1) 


 


 


Nucleated RBC Absolute Count


(auto) 0.00 K/uL 


  


 


 


Prothrombin Time


  13.0 seconds


(12.0-14.4) 


 


 


Prothromb Time International


Ratio 0.98 


  


 


 


Activated Partial


Thromboplast Time 31 seconds


(23-35) 


 


 


Sodium Level


  139 mmol/L


(137-145) 


 


 


Potassium Level


  5.7 mmol/L


(3.5-5.0) 


 


 


Chloride Level


  102 mmol/L


() 


 


 


Carbon Dioxide Level


  24 mmol/L


(22-31) 


 


 


Blood Urea Nitrogen


  27 mg/dl


(7-18) 


 


 


Creatinine


  1.00 mg/dl


(0.52-1.04) 


 


 


Glomerular Filtration Rate


Calc 55.8 


  


 


 


Random Glucose


  124 mg/dl


() 


 


 


Calcium Level


  11.0 mg/dl


(8.4-10.2) 


 


 


Total Bilirubin


  0.3 mg/dl


(0.2-1.3) 


 


 


Aspartate Amino Transf


(AST/SGOT) 22 U/L (0-35) 


  


 


 


Alanine Aminotransferase


(ALT/SGPT) 33 U/L (0-56) 


  


 


 


Alkaline Phosphatase


  150 U/L


(0-126) 


 


 


Total Protein


  7.0 gm/dl


(6.3-8.2) 


 


 


Albumin


  3.6 g/dl


(3.5-5.0) 


 


 


Lipase


  167 U/L


() 


 


 


Serum Alcohol < 10 mg/dl  


 


Urine Color  Yellow 


 


Urine Clarity


  


  Slightly-cloudy


 


 


Urine pH


  


  5.0 pH


(4.8-9.5)


 


Urine Specific Gravity  1.020 


 


Urine Protein


  


  Negative mg/dL


(NEGATIVE)


 


Urine Glucose (UA)


  


  Negative mg/dL


(NEGATIVE)


 


Urine Ketones


  


  Negative mg/dL


(NEGATIVE)


 


Urine Blood


  


  Negative


(NEGATIVE)


 


Urine Nitrite


  


  Negative


(NEGATIVE)


 


Urine Bilirubin


  


  Negative


(NEGATIVE)


 


Urine Urobilinogen


  


  Negative mg/dL


(0.2-1.9)


 


Urine Leukocyte Esterase


  


  Negative


(NEGATIVE)


 


Urine RBC


  


  <1 /HPF


(0-2/HPF)


 


Urine WBC


  


  1 /HPF


(0-5/HPF)


 


Urine Squamous Epithelial


Cells 


  Few /LPF


(NONE-FEW)


 


Urine Bacteria


  


  Negative /HPF


(NONE-FEW)


 


Urine Hyaline Casts


  


  Few /LPF


(NONE-FEW)


 


Urine Mucus


  


  None /HPF


(NONE-FEW)








Chemistry








Test


  4/12/18


10:42 4/12/18


11:51


 


White Blood Count


  4.8 k/uL


(4.5-11.0) 


 


 


Red Blood Count


  4.29 M/uL


(4.17-5.56) 


 


 


Hemoglobin


  11.6 g/dL


(12.0-16.0) 


 


 


Hematocrit


  35.3 %


(34.0-47.0) 


 


 


Mean Corpuscular Volume


  82.2 fL


(80.0-96.0) 


 


 


Mean Corpuscular Hemoglobin


  26.9 pg


(26.0-33.0) 


 


 


Mean Corpuscular Hemoglobin


Concent 32.8 g/dL


(32.0-36.0) 


 


 


Red Cell Distribution Width


  15.8 %


(11.5-14.5) 


 


 


Platelet Count


  175 K/uL


(150-450) 


 


 


Mean Platelet Volume


  8.3 fL


(7.2-11.1) 


 


 


Neutrophils (%) (Auto)


  68.3 %


(39.4-72.5) 


 


 


Lymphocytes (%) (Auto)


  22.3 %


(17.6-49.6) 


 


 


Monocytes (%) (Auto)


  9.0 %


(4.1-12.4) 


 


 


Eosinophils (%) (Auto)


  0.2 %


(0.4-6.7) 


 


 


Basophils (%) (Auto)


  0.2 %


(0.3-1.4) 


 


 


Nucleated RBC Relative Count


(auto) 0.1 /100WBC 


  


 


 


Neutrophils # (Auto)


  3.3 K/uL


(2.0-7.4) 


 


 


Lymphocytes # (Auto)


  1.1 K/uL


(1.3-3.6) 


 


 


Monocytes # (Auto)


  0.4 K/uL


(0.3-1.0) 


 


 


Eosinophils # (Auto)


  0.0 K/uL


(0.0-0.5) 


 


 


Basophils # (Auto)


  0.0 K/uL


(0.0-0.1) 


 


 


Nucleated RBC Absolute Count


(auto) 0.00 K/uL 


  


 


 


Prothrombin Time


  13.0 seconds


(12.0-14.4) 


 


 


Prothromb Time International


Ratio 0.98 


  


 


 


Activated Partial


Thromboplast Time 31 seconds


(23-35) 


 


 


Glomerular Filtration Rate


Calc 55.8 


  


 


 


Calcium Level


  11.0 mg/dl


(8.4-10.2) 


 


 


Total Bilirubin


  0.3 mg/dl


(0.2-1.3) 


 


 


Aspartate Amino Transf


(AST/SGOT) 22 U/L (0-35) 


  


 


 


Alanine Aminotransferase


(ALT/SGPT) 33 U/L (0-56) 


  


 


 


Alkaline Phosphatase


  150 U/L


(0-126) 


 


 


Total Protein


  7.0 gm/dl


(6.3-8.2) 


 


 


Albumin


  3.6 g/dl


(3.5-5.0) 


 


 


Lipase


  167 U/L


() 


 


 


Serum Alcohol < 10 mg/dl  


 


Urine Color  Yellow 


 


Urine Clarity


  


  Slightly-cloudy


 


 


Urine pH


  


  5.0 pH


(4.8-9.5)


 


Urine Specific Gravity  1.020 


 


Urine Protein


  


  Negative mg/dL


(NEGATIVE)


 


Urine Glucose (UA)


  


  Negative mg/dL


(NEGATIVE)


 


Urine Ketones


  


  Negative mg/dL


(NEGATIVE)


 


Urine Blood


  


  Negative


(NEGATIVE)


 


Urine Nitrite


  


  Negative


(NEGATIVE)


 


Urine Bilirubin


  


  Negative


(NEGATIVE)


 


Urine Urobilinogen


  


  Negative mg/dL


(0.2-1.9)


 


Urine Leukocyte Esterase


  


  Negative


(NEGATIVE)


 


Urine RBC


  


  <1 /HPF


(0-2/HPF)


 


Urine WBC


  


  1 /HPF


(0-5/HPF)


 


Urine Squamous Epithelial


Cells 


  Few /LPF


(NONE-FEW)


 


Urine Bacteria


  


  Negative /HPF


(NONE-FEW)


 


Urine Hyaline Casts


  


  Few /LPF


(NONE-FEW)


 


Urine Mucus


  


  None /HPF


(NONE-FEW)








Coagulation








Test


  4/12/18


10:42


 


Prothrombin Time 13.0 seconds 


 


Prothromb Time International


Ratio 0.98 


 


 


Activated Partial


Thromboplast Time 31 seconds 


 








Toxicology








Test


  4/12/18


10:42


 


Serum Alcohol < 10 mg/dl 








Urinalysis








Test


  4/12/18


11:51


 


Urine Color Yellow 


 


Urine Clarity


  Slightly-cloudy


 


 


Urine pH


  5.0 pH


(4.8-9.5)


 


Urine Specific Gravity 1.020 


 


Urine Protein


  Negative mg/dL


(NEGATIVE)


 


Urine Glucose (UA)


  Negative mg/dL


(NEGATIVE)


 


Urine Ketones


  Negative mg/dL


(NEGATIVE)


 


Urine Blood


  Negative


(NEGATIVE)


 


Urine Nitrite


  Negative


(NEGATIVE)


 


Urine Bilirubin


  Negative


(NEGATIVE)


 


Urine Urobilinogen


  Negative mg/dL


(0.2-1.9)


 


Urine Leukocyte Esterase


  Negative


(NEGATIVE)


 


Urine RBC


  <1 /HPF


(0-2/HPF)


 


Urine WBC


  1 /HPF


(0-5/HPF)


 


Urine Squamous Epithelial


Cells Few /LPF


(NONE-FEW)


 


Urine Bacteria


  Negative /HPF


(NONE-FEW)


 


Urine Hyaline Casts


  Few /LPF


(NONE-FEW)


 


Urine Mucus


  None /HPF


(NONE-FEW)











ED Course/Re-evaluation


ED Course


E critical course medical decision making 63-year-old female with a fistula 

with obvious infected and potentially displaced and evaluated by her bedside by 

her Gen. surgery determined to require transfer to the institution higher level 

of care will arrange transfer acceptant started antibiotics


Decision to Disposition Date:  Apr 12, 2018


Decision to Disposition Time:  14:32





Depart


Departure


Latest Vital Signs





Vital Signs








  Date Time  Temp Pulse Resp B/P (MAP) Pulse Ox O2 Delivery O2 Flow Rate FiO2


 


4/12/18 11:55       2.0 


 


4/12/18 11:54    128/61 (83)    


 


4/12/18 11:44  65   95   


 


4/12/18 10:23 99.6  16   Room Air  








Impression:  


 Primary Impression:  


 Abdominal wall abscess


Condition:  Improved


Disposition:  XFER TO [facility]


Referrals:  


OREN GARCIA MD (PCP)











PHI MONTEZ MD Apr 12, 2018 14:33

## 2018-05-02 ENCOUNTER — HOSPITAL ENCOUNTER (OUTPATIENT)
Dept: HOSPITAL 89 - PT | Age: 63
Discharge: HOME | End: 2018-05-02
Attending: FAMILY MEDICINE
Payer: SELF-PAY

## 2018-05-02 DIAGNOSIS — K63.2: Primary | ICD-10-CM

## 2018-05-02 PROCEDURE — 97163 PT EVAL HIGH COMPLEX 45 MIN: CPT

## 2018-05-25 ENCOUNTER — HOSPITAL ENCOUNTER (OUTPATIENT)
Dept: HOSPITAL 89 - CT | Age: 63
End: 2018-05-25
Attending: FAMILY MEDICINE
Payer: SELF-PAY

## 2018-05-25 DIAGNOSIS — K31.6: Primary | ICD-10-CM

## 2018-05-25 DIAGNOSIS — R16.2: ICD-10-CM

## 2018-05-25 DIAGNOSIS — K43.6: ICD-10-CM

## 2018-05-25 DIAGNOSIS — K63.2: ICD-10-CM

## 2018-05-25 DIAGNOSIS — N20.0: ICD-10-CM

## 2018-05-25 PROCEDURE — 36415 COLL VENOUS BLD VENIPUNCTURE: CPT

## 2018-05-25 PROCEDURE — 74178 CT ABD&PLV WO CNTR FLWD CNTR: CPT

## 2018-05-25 PROCEDURE — 82565 ASSAY OF CREATININE: CPT

## 2018-05-25 NOTE — RADIOLOGY IMAGING REPORT
FACILITY: [facility] 

 

PATIENT NAME: Rosalia King

: 1955

MR: [id number]

V: [id number]

EXAM DATE: 

ORDERING PHYSICIAN: OREN GARCIA

TECHNOLOGIST: 

 

Location: [facility]

Patient: Rosalia King

: 1955

MRN: [id number]

Visit/Account:[id number]

Date of Sevice:  2018

 

ACCESSION #: [id number]

 

CT abdomen with and without IV contrast

CT pelvis with and without IV contrast

 

History: Assessed long-standing stomach or duodenal fistulas.  Increased output from drainage and inc
reased abdominal pain.

 

COMPARISON STUDIES:   CT abdomen and pelvis 2018

 

TECHNIQUE:   Axial CT images were obtained through the abdomen and pelvis prior to and during injecti
on of nonionic iodinated intravenous contrast. Reformatted coronal and sagittal images were also obta
ined.

Contrast:   75 ml of Isovue-370 IV contrast.

 

One of the following dose optimization techniques was utilized in the performance of this exam: Autom
ated exposure control; adjustment of the mA and/or kV according to the patient's size; or use of an i
terative  reconstruction technique.  Specific details can be referenced in the facility's radiology C
T exam operational policy.

 

FINDINGS:

Chest bases:  Trace left pleural fluid and basilar pleural/parenchymal subsegmental atelectasis/scarr
ing are unchanged.

 

Liver:  Large right liver lobe causing eventration of the hemidiaphragm into the chest.  The right lo
be measures 20 cm length.  Liver margins are irregular.

Spleen:  Spleen measures 15 x 5 x 13 cm, unchanged.

Gallbladder and bile ducts:  Cholecystectomy.  There may be a 3 mm calcified stone within or adjacent
 to the left hepatic duct, unchanged.

Pancreas:  negative

Adrenal glands:  negative

Kidneys:  Left kidney lower pole 4 mm stone is nonobstructing.

 

Pelvic  structures:  Uterus is surgically absent.  Ovaries are not visualized.

 

Bowel, mesenteries:  A right percutaneous drainage catheter is seen along the anteromedial margin of 
the liver.  A small tract extends from the distal tip of the drain to the anterior margin of the prox
imal duodenum, image 50, series 6, and to the left liver lobe lesser curvature of the stomach margin,
 image 48.  Gas within the lumen of these tracts is reduced compared to the prior study.  There jacek
nues to be a fat haziness in the area of these fistulas.  Postsurgical change of a Billroth II proced
ure is again noted.  Small hiatal hernia.

 

Vessels:  negative

 

Musculoskeletal:   Moderate lumbosacral degenerative disc change.

Body wall:  Supraumbilical midline ventral hernia contains nonobstructed transverse colon and small b
owel, unchanged.  Scattered subcutaneous nodules in the subcutaneous fat of the lower abdomen are mor
e prominent, and may be due to injections.  Right upper quadrant surgical scar is noted.

 

Lymph node assessment:  negative

 

IMPRESSION:

1.  Upper abdominal fistula tracts extending from the distal drain to the proximal duodenum and stoma
ch are still present, but are less distended with gas compared to the previous study.

2.  Left kidney lower pole nonobstructing stone, unchanged.

3.  Hepatosplenomegaly is unchanged.  Irregular margins of the liver suggest underlying chronic hepat
ocellular disease.

4.  Supraumbilical ventral hernia containing nonobstructed small bowel and transverse colon, unchange
d.

 

 

Report Dictated By: Maria Fernanda Chapman MD at 2018 10:50 AM

 

Report E-Signed By: Maria Fernanda Chapman MD  at 2018 11:36 AM

 

WSN:ETHEL

## 2019-02-10 ENCOUNTER — HOSPITAL ENCOUNTER (EMERGENCY)
Dept: HOSPITAL 89 - ER | Age: 64
Discharge: HOME | End: 2019-02-10
Payer: MEDICARE

## 2019-02-10 VITALS — DIASTOLIC BLOOD PRESSURE: 79 MMHG | SYSTOLIC BLOOD PRESSURE: 122 MMHG

## 2019-02-10 DIAGNOSIS — R07.9: Primary | ICD-10-CM

## 2019-02-10 LAB — PLATELET COUNT, AUTOMATED: 199 K/UL (ref 150–450)

## 2019-02-10 PROCEDURE — 71046 X-RAY EXAM CHEST 2 VIEWS: CPT

## 2019-02-10 PROCEDURE — 84075 ASSAY ALKALINE PHOSPHATASE: CPT

## 2019-02-10 PROCEDURE — 84295 ASSAY OF SERUM SODIUM: CPT

## 2019-02-10 PROCEDURE — 82310 ASSAY OF CALCIUM: CPT

## 2019-02-10 PROCEDURE — 84460 ALANINE AMINO (ALT) (SGPT): CPT

## 2019-02-10 PROCEDURE — 84132 ASSAY OF SERUM POTASSIUM: CPT

## 2019-02-10 PROCEDURE — 82435 ASSAY OF BLOOD CHLORIDE: CPT

## 2019-02-10 PROCEDURE — 85025 COMPLETE CBC W/AUTO DIFF WBC: CPT

## 2019-02-10 PROCEDURE — 82040 ASSAY OF SERUM ALBUMIN: CPT

## 2019-02-10 PROCEDURE — 82947 ASSAY GLUCOSE BLOOD QUANT: CPT

## 2019-02-10 PROCEDURE — 82374 ASSAY BLOOD CARBON DIOXIDE: CPT

## 2019-02-10 PROCEDURE — 82247 BILIRUBIN TOTAL: CPT

## 2019-02-10 PROCEDURE — 82565 ASSAY OF CREATININE: CPT

## 2019-02-10 PROCEDURE — 99284 EMERGENCY DEPT VISIT MOD MDM: CPT

## 2019-02-10 PROCEDURE — 84520 ASSAY OF UREA NITROGEN: CPT

## 2019-02-10 PROCEDURE — 84155 ASSAY OF PROTEIN SERUM: CPT

## 2019-02-10 PROCEDURE — 84484 ASSAY OF TROPONIN QUANT: CPT

## 2019-02-10 PROCEDURE — 36415 COLL VENOUS BLD VENIPUNCTURE: CPT

## 2019-02-10 PROCEDURE — 84450 TRANSFERASE (AST) (SGOT): CPT

## 2019-02-10 PROCEDURE — 93005 ELECTROCARDIOGRAM TRACING: CPT

## 2019-02-10 NOTE — RADIOLOGY IMAGING REPORT
[place] [facility] [place] 

 

PATIENT NAME: Rosalia King

: 1955

MR: [id number]

V: [id number]

EXAM DATE: 

ORDERING PHYSICIAN: KAVON JARA

TECHNOLOGIST: 

 

Location: [facility]

Patient: Rosalia King

: 1955

MRN: [id number]

Visit/Account:[id number]

Date of Sevice:  2/10/2019

 

ACCESSION #: [id number]

 

CHEST PA LAT

 

HISTORY: Chest Pain

 

COMPARISON: 2018

 

FINDINGS:

 

Cardiomediastinal contours: Normal

Lungs and pleura: Stable elevation the right diaphragm. Stable streaky opacities at the left lung bas
e. No pneumothorax or pleural effusion.

Bones/soft tissues: Normal

Other findings: None significant

 

IMPRESSION:

 

1. Chronic elevation of the right diaphragm.

2. Stable streaky opacities at the left lung base, likely scar/atelectasis.

 

Report Dictated By: Parminder Fierro MD at 2/10/2019 1:53 PM

 

Report E-Signed By: Parminder Fierro MD  at 2/10/2019 1:55 PM

 

WSN:[id number]

## 2019-02-10 NOTE — ER REPORT
History and Physical


Time Seen By MD:  13:23


HPI/ROS


CHIEF COMPLAINT: Chest pain





HISTORY OF PRESENT ILLNESS: This is a 64-year-old female presents to emergency 


department for chest pain. Patient states that last night while watching TV she 


developed midsternal anterior chest pain, states that the pain did subside and 


was able to go to bed, she slept until about noon today, when she woke up she 


noticed she had some mild chest discomfort, in the same location as last night. 


No nausea or vomiting. 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 No visual 


changes. No rashes. Patient states that she does have a rather extensive history


of abdominal surgeries, with fistulas, she is scheduled for a dental procedure 


here in the next week and has been on antibiotics, and they "haven't been 


changing my stomach medicine around", patient states she is unsure if this is r


elated. No recent fevers or chills.





REVIEW OF SYSTEMS:


Constitutional: No fever, no chills.


Eyes: No discharge.


ENT: No sore throat. 


Cardiovascular: As above.


Respiratory: No cough, no shortness of breath.


Gastrointestinal: No abdominal pain, no vomiting.


Genitourinary: No hematuria.


Musculoskeletal: No back pain.


Skin: No rashes.


Neurological: No headache.


Allergies:  


Coded Allergies:  


     amoxicillin (Verified  Allergy, Unknown, 18)


     clavulanic acid (Verified  Allergy, Unknown, 18)


     ibuprofen (Verified  Allergy, Unknown, 2/10/19)


     piperacillin (Verified  Allergy, Unknown, 2/10/19)


     tazobactam (Verified  Allergy, Unknown, 2/10/19)


     vancomycin (Verified  Allergy, Unknown, 2/10/19)


Home Meds


Active Scripts


Oxycodone HCl (Oxaydo) 5 Mg Tablet.orl, 1 TAB PO Q4-6H PRN for PAIN, #12 TAB


   Prov:IBRAHIMA JAQUEZ         18


Reported Medications


Albuterol Sulfate 90 Mcg/Act (PROAIR HFA 90 MCG/ACT) 8.5 Gm Hfa.aer.ad, 2 PUFF 


IH Q4-6H PRN for WHEEZING, INHALER


   2/10/19


Ondansetron 4 Mg Odt (ONDANSETRON 4 MG ODT) 4 Mg Tab.rapdis, 4 MG PO Q6H PRN for


NAUSEA, TAB


   2/10/19


Atorvastatin Calcium (LIPITOR) 10 Mg Tablet, 1 TAB PO HS, TAB


   2/10/19


Sucralfate (SUCRALFATE) 1 Gm Tablet, 1 GM PO QID


   2/10/19


Gabapentin (GABAPENTIN) 300 Mg Capsule, 600 MG PO TID, CAPSULE


   2/10/19


Clindamycin Hcl (CLINDAMYCIN HCL) 300 Mg Capsule, 150 MG PO Q6H, #40 CAPSULE


   18


Omeprazole (OMEPRAZOLE) 20 Mg Tablet.dr, 20 MG PO BID, TAB


   18


Atenolol (ATENOLOL) 50 Mg Tablet, 1 TAB PO BID, TAB


   18


Cetirizine HCl (24Hour Allergy) 10 Mg Tablet


   17


Simvastatin (SIMVASTATIN) 10 Mg Tablet, 10 MG PO HS, TAB


   3/18/17


Famotidine (FAMOTIDINE) 20 Mg Tablet, 20 MG PO QHS, TAB


   3/18/17


Citalopram Hydrobromide (CITALOPRAM HBR) 20 Mg Tablet, 20 MG PO QDAY, #5 TAB


   3/18/17


Lidocaine HCl (Aspercreme) 4 % Cream..g., TOP


   3/18/17


Lisinopril (LISINOPRIL) 20 Mg Tablet, 40 PO QDAY, TAB


   10/7/15


Aspirin (ASPIRIN) 81 Mg Tab.chew, 81 MG PO QDAY, TAB.CHEW


   TAKE 1 TABLET BY MOUTH EVERY DAY


   10/2/14


Metformin Hcl (METFORMIN HCL) 1,000 Mg Tablet, 1 TAB PO BID


   TAKE ONE TABLET BY MOUTH TWO TIMES A DAY


   10/2/14


Buspirone Hcl (BUSPIRONE HCL) 15 Mg Tablet, 10 MG PO BID, #10 TAB


   TAKE 1 TABLET BY MOUTH TWICE A DAY


   10/2/14


Discontinued Reported Medications


Cholestyramine (CHOLESTYRAMINE RESIN) 5 Gm Powder, 5 GM MC


   18


Past Medical/Surgical History


The patient has a past medical and surgical history of hypertension, 


hyperlipidemia, pneumonia, hiatal hernia, frequent urinary tract infections, 


ankle fracture, back pain, type II diabetes, anemia, abdominal mass removal, 


fistula, cholecystectomy, hysterectomy, wisdom tooth extraction.


Hx Smoking:  No


Smoking Status:  Never Smoker


Exposure to Second Hand Smoke?:  No


Constitutional





Vital Sign - Last 24 Hours








 2/10/19 2/10/19 2/10/19 2/10/19





 13:18 13:24 13:29 13:30


 


Pulse 66   


 


B/P (MAP)  143/59 (87)  142/76 (98)


 


O2 Flow Rate   2.0 





 2/10/19 2/10/19 2/10/19 2/10/19





 13:30 13:48 14:00 14:18


 


Pulse 63 63  62


 


Resp 18   15


 


B/P (MAP) 143/59  119/64 (82) 


 


Pulse Ox 92   96


 


O2 Delivery Nasal Cannula   





 2/10/19 2/10/19 2/10/19 2/10/19





 14:30 14:48 14:53 15:00


 


Pulse  59 61 


 


Resp  18 16 


 


B/P (MAP) 129/78 (95)   130/79 (96)


 


Pulse Ox  94 94 





 2/10/19 2/10/19 2/10/19 2/10/19





 15:23 15:30 15:53 16:00


 


Pulse ???  61 


 


Resp 14  15 


 


B/P (MAP)  125/74 (91)  125/72 (89)


 


Pulse Ox 92  93 





 2/10/19 2/10/19 2/10/19 





 16:23 16:28 16:30 


 


Pulse 64 60  


 


Resp 16 16  


 


B/P (MAP)   122/79 (93) 


 


Pulse Ox 94 93  








Physical Exam


   General Appearance: The patient is alert, has no immediate need for airway 


protection and no signs of toxicity. 


Eyes: Pupils equal and round no pallor or injection.


ENT, Mouth: Mucous membranes are moist.


Respiratory: There are no retractions, lungs are clear to auscultation.


Cardiovascular: Regular rate and rhythm, no murmurs, clicks or rubs.


Gastrointestinal:  Abdomen is round, soft and no unusual tenderness, no masses, 


bowel sounds normal. Cardiac fistula to the right abdomen. Normal drainage s


eeping through the dressing


Neurological: Alert and oriented 4. Moving all extremities. Following all 


commands. No focal neuro deficits.


Skin: Warm and dry, no rashes.


Musculoskeletal:  Neck is supple non tender.


      Extremities are nontender, nonswollen and have full range of motion.





DIFFERENTIAL DIAGNOSIS: After history and physical exam differential diagnosis 


was considered for chest pain including but not limited to myocardial ischemia, 


pericarditis pulmonary embolus, chest wall pain, pleural inflammation and 


pulmonary infectious causes.





Medical Decision Making


Data Points


Result Diagram:  


2/10/19 1330                                                                    


           2/10/19 1330





Laboratory





Hematology








Test


 2/10/19


13:30 2/10/19


15:26


 


Red Blood Count


 4.60 M/uL


(4.17-5.56) 





 


Mean Corpuscular Volume


 79.7 fL


(80.0-96.0) 





 


Mean Corpuscular Hemoglobin


 24.5 pg


(26.0-33.0) 





 


Mean Corpuscular Hemoglobin


Concent 30.8 g/dL


(32.0-36.0) 





 


Red Cell Distribution Width


 16.6 %


(11.5-14.5) 





 


Mean Platelet Volume


 8.3 fL


(7.2-11.1) 





 


Neutrophils (%) (Auto)


 64.8 %


(39.4-72.5) 





 


Lymphocytes (%) (Auto)


 26.5 %


(17.6-49.6) 





 


Monocytes (%) (Auto)


 6.3 %


(4.1-12.4) 





 


Eosinophils (%) (Auto)


 2.0 %


(0.4-6.7) 





 


Basophils (%) (Auto)


 0.4 %


(0.3-1.4) 





 


Nucleated RBC Relative Count


(auto) 0.1 /100WBC 


 





 


Neutrophils # (Auto)


 2.7 K/uL


(2.0-7.4) 





 


Lymphocytes # (Auto)


 1.1 K/uL


(1.3-3.6) 





 


Monocytes # (Auto)


 0.3 K/uL


(0.3-1.0) 





 


Eosinophils # (Auto)


 0.1 K/uL


(0.0-0.5) 





 


Basophils # (Auto)


 0.0 K/uL


(0.0-0.1) 





 


Nucleated RBC Absolute Count


(auto) 0.00 K/uL 


 





 


Sodium Level


 138 mmol/L


(137-145) 





 


Potassium Level


 5.9 mmol/L


(3.5-5.0) 





 


Chloride Level


 105 mmol/L


() 





 


Carbon Dioxide Level


 26 mmol/L


(22-31) 





 


Blood Urea Nitrogen


 31 mg/dl


(7-18) 





 


Creatinine


 1.00 mg/dl


(0.52-1.04) 





 


Glomerular Filtration Rate


Calc 55.6 


 





 


Random Glucose


 168 mg/dl


() 





 


Calcium Level


 10.9 mg/dl


(8.4-10.2) 





 


Total Bilirubin


 0.2 mg/dl


(0.2-1.3) 





 


Aspartate Amino Transf


(AST/SGOT) 38 U/L (0-35) 


 





 


Alanine Aminotransferase


(ALT/SGPT) 35 U/L (0-56) 


 





 


Alkaline Phosphatase


 141 U/L


(0-126) 





 


Total Protein


 7.4 g/dl


(6.3-8.2) 





 


Albumin


 4.1 g/dl


(3.5-5.0) 





 


Troponin I  < 0.012 ng/ml 








Chemistry








Test


 2/10/19


13:30 2/10/19


15:26


 


White Blood Count


 4.2 k/uL


(4.5-11.0) 





 


Red Blood Count


 4.60 M/uL


(4.17-5.56) 





 


Hemoglobin


 11.3 g/dL


(12.0-16.0) 





 


Hematocrit


 36.7 %


(34.0-47.0) 





 


Mean Corpuscular Volume


 79.7 fL


(80.0-96.0) 





 


Mean Corpuscular Hemoglobin


 24.5 pg


(26.0-33.0) 





 


Mean Corpuscular Hemoglobin


Concent 30.8 g/dL


(32.0-36.0) 





 


Red Cell Distribution Width


 16.6 %


(11.5-14.5) 





 


Platelet Count


 199 K/uL


(150-450) 





 


Mean Platelet Volume


 8.3 fL


(7.2-11.1) 





 


Neutrophils (%) (Auto)


 64.8 %


(39.4-72.5) 





 


Lymphocytes (%) (Auto)


 26.5 %


(17.6-49.6) 





 


Monocytes (%) (Auto)


 6.3 %


(4.1-12.4) 





 


Eosinophils (%) (Auto)


 2.0 %


(0.4-6.7) 





 


Basophils (%) (Auto)


 0.4 %


(0.3-1.4) 





 


Nucleated RBC Relative Count


(auto) 0.1 /100WBC 


 





 


Neutrophils # (Auto)


 2.7 K/uL


(2.0-7.4) 





 


Lymphocytes # (Auto)


 1.1 K/uL


(1.3-3.6) 





 


Monocytes # (Auto)


 0.3 K/uL


(0.3-1.0) 





 


Eosinophils # (Auto)


 0.1 K/uL


(0.0-0.5) 





 


Basophils # (Auto)


 0.0 K/uL


(0.0-0.1) 





 


Nucleated RBC Absolute Count


(auto) 0.00 K/uL 


 





 


Glomerular Filtration Rate


Calc 55.6 


 





 


Calcium Level


 10.9 mg/dl


(8.4-10.2) 





 


Total Bilirubin


 0.2 mg/dl


(0.2-1.3) 





 


Aspartate Amino Transf


(AST/SGOT) 38 U/L (0-35) 


 





 


Alanine Aminotransferase


(ALT/SGPT) 35 U/L (0-56) 


 





 


Alkaline Phosphatase


 141 U/L


(0-126) 





 


Total Protein


 7.4 g/dl


(6.3-8.2) 





 


Albumin


 4.1 g/dl


(3.5-5.0) 





 


Troponin I  < 0.012 ng/ml 











EKG/Imaging


EKG Interpretation


12 lead EKG: Time of EKG 1320.


      Rhythm: Normal sinus rhythm, ventricular rate 64 bpm.


      Axis: Left ventricular hypertrophy.


      QRS: normal


      ST segments: No ST depression or elevation identified.


No significant changes from the 2018 EKG.


Imaging


Location: [facility] [place] [facility]


Patient: Rosalia King


: 1955


MRN: [id number]


Visit/Account:[id number]


Date of Sevice:  2/10/2019


 


ACCESSION #: [id number]


 


CHEST PA LAT


 


HISTORY: Chest Pain


 


COMPARISON: 2018


 


FINDINGS:


 


Cardiomediastinal contours: Normal


Lungs and pleura: Stable elevation the right diaphragm. Stable streaky opacities


at the left lung base. No pneumothorax or pleural effusion.


Bones/soft tissues: Normal


Other findings: None significant


 


IMPRESSION:


 


1. Chronic elevation of the right diaphragm.


2. Stable streaky opacities at the left lung base, likely scar/atelectasis.


 


Report Dictated By: Parminder Fierro MD at 2/10/2019 1:53 PM


 


Report E-Signed By: Parminder Fierro MD  at 2/10/2019 1:55 PM


 


WSN:[id number]





ED Course/Re-evaluation


Clinical Indication for ER IV:  Hydration, IV Access


ED Course


The patient was admitted to room. A history of physical were obtained. 


Differential diagnoses were considered. An IV was started. A CBC, CMP, troponin 


were obtained. A and EKG showing normal sinus rhythm, no ST elevation or 


depression.CBC showing white blood cell count of 4.2, MCV 79.7, chemistry 


showing potassium 5.9 which has steadily increased over the last several months,


glucose 168, calcium 10.9 which is similar to previous studies, AST 38, alk 


phosphatase 141, initial troponin was negative. No acute findings on a two-view 


chest x-ray. I discussed the results with the patient, I did recommend a repeat 


troponin, patient was agreeable, the repeat troponin was negative. Patient was 


given a GI cocktail with relief. I did tell the patient that the pain she is 


experiencing is likely acid reflux as there were no concerning findings on EKG 


or troponin today. Patient states that her primary care provider did change her 


medications around, she has not taken her routine "stomach pill", she is unsure 


what this pill is. She wasn't sure if she was supposed to continue that, she 


will follow-up with her primary care provider for reevaluation tomorrow. She had


no other questions or concerns at the time of discharge, patient was agreeable 


with plan care.


Decision to Disposition Date:  Feb 10, 2019


Decision to Disposition Time:  16:27





Depart


Departure


Latest Vital Signs





Vital Signs








  Date Time  Temp Pulse Resp B/P (MAP) Pulse Ox O2 Delivery O2 Flow Rate FiO2


 


2/10/19 16:30    122/79 (93)    


 


2/10/19 16:28  60 16  93   


 


2/10/19 13:30      Nasal Cannula  


 


2/10/19 13:29       2.0 








Impression:  


   Primary Impression:  


   Chest pain of unknown etiology


Condition:  Improved


Disposition:  HOME OR SELF-CARE


Referrals:  


OREN GARCIA MD (PCP)


1 Day


Patient Instructions:  Chest Pain (ED), Gastroesophageal Reflux Disease (ED)





Additional Instructions:  


No concerning findings on laboratory studies, EKG or chest x-ray today.


I suspect your chest pain is related to acid reflux and the recent medication 


changes established by her primary care provider.


Please contact her primary care provider tomorrow for reevaluation and to 


discuss the medication changes.


I would also talked your primary care provider about your potassium and blood 


sugar.


Continue with your current medications.


Drink plenty of water.


Get plenty of rest.


Return to the ER for any other concerns or worsening symptoms.











KAVON JARA FNP-BC      Feb 10, 2019 13:23

## 2019-02-10 NOTE — EKG
FACILITY: [facility] 

 

PATIENT NAME: SLADE AGUILAR

: [id number]

MR: [id number]

V: [id number]

EXAM DATE: 

ORDERING PHYSICIAN: KAVON JARA

TECHNOLOGIST: TIP

 

Test Reason : CP

Blood Pressure : ***/*** mmHG

Vent. Rate : 064 BPM     Atrial Rate : 064 BPM

   P-R Int : 136 ms          QRS Dur : 076 ms

    QT Int : 382 ms       P-R-T Axes : 043 -07 022 degrees

   QTc Int : 394 ms

 

Normal sinus rhythm

Voltage criteria for left ventricular hypertrophy

Abnormal ECG

When compared with ECG of 2018 15:09,

No significant change was found

Confirmed by STEFFEN CHRISTY ([id number]) on 2/10/2019 3:58:01 PM

 

Referred By:  KAVON           Confirmed By:STEFFEN CHRISTY

## 2019-06-19 ENCOUNTER — HOSPITAL ENCOUNTER (OUTPATIENT)
Dept: HOSPITAL 89 - LAB | Age: 64
End: 2019-06-19
Attending: NURSE PRACTITIONER
Payer: MEDICARE

## 2019-06-19 DIAGNOSIS — E87.5: Primary | ICD-10-CM

## 2019-06-19 DIAGNOSIS — K31.6: ICD-10-CM

## 2019-06-19 DIAGNOSIS — R10.13: ICD-10-CM

## 2019-06-19 DIAGNOSIS — R19.7: ICD-10-CM

## 2019-06-19 LAB — PLATELET COUNT, AUTOMATED: 210 K/UL (ref 150–450)

## 2019-06-19 PROCEDURE — 82374 ASSAY BLOOD CARBON DIOXIDE: CPT

## 2019-06-19 PROCEDURE — 84075 ASSAY ALKALINE PHOSPHATASE: CPT

## 2019-06-19 PROCEDURE — 83630 LACTOFERRIN FECAL (QUAL): CPT

## 2019-06-19 PROCEDURE — 87324 CLOSTRIDIUM AG IA: CPT

## 2019-06-19 PROCEDURE — 82947 ASSAY GLUCOSE BLOOD QUANT: CPT

## 2019-06-19 PROCEDURE — 87177 OVA AND PARASITES SMEARS: CPT

## 2019-06-19 PROCEDURE — 87449 NOS EACH ORGANISM AG IA: CPT

## 2019-06-19 PROCEDURE — 82435 ASSAY OF BLOOD CHLORIDE: CPT

## 2019-06-19 PROCEDURE — 82565 ASSAY OF CREATININE: CPT

## 2019-06-19 PROCEDURE — 84450 TRANSFERASE (AST) (SGOT): CPT

## 2019-06-19 PROCEDURE — 74177 CT ABD & PELVIS W/CONTRAST: CPT

## 2019-06-19 PROCEDURE — 82310 ASSAY OF CALCIUM: CPT

## 2019-06-19 PROCEDURE — 84460 ALANINE AMINO (ALT) (SGPT): CPT

## 2019-06-19 PROCEDURE — 36415 COLL VENOUS BLD VENIPUNCTURE: CPT

## 2019-06-19 PROCEDURE — 82247 BILIRUBIN TOTAL: CPT

## 2019-06-19 PROCEDURE — 82040 ASSAY OF SERUM ALBUMIN: CPT

## 2019-06-19 PROCEDURE — 84295 ASSAY OF SERUM SODIUM: CPT

## 2019-06-19 PROCEDURE — 84155 ASSAY OF PROTEIN SERUM: CPT

## 2019-06-19 PROCEDURE — 84520 ASSAY OF UREA NITROGEN: CPT

## 2019-06-19 PROCEDURE — 85025 COMPLETE CBC W/AUTO DIFF WBC: CPT

## 2019-06-19 PROCEDURE — 87045 FECES CULTURE AEROBIC BACT: CPT

## 2019-06-19 PROCEDURE — 84132 ASSAY OF SERUM POTASSIUM: CPT

## 2019-06-19 NOTE — RADIOLOGY IMAGING REPORT
FACILITY: [facility] [place] 

 

PATIENT NAME: Rosalia King

: 1955

MR: [id number]

V: [id number]

EXAM DATE: 

ORDERING PHYSICIAN: MUNIR FIGUEROA

TECHNOLOGIST: 

 

Location: [facility]

Patient: Rosalia King

: 1955

MRN: [id number]

Visit/Account:[id number]

Date of Sevice:  2019

 

ACCESSION #: [id number]

 

CT ABDOMEN PELVIS W/ CON

 

HISTORY: Diarrhea, abdominal pain, fistula

 

TECHNIQUE:  CT abdomen and pelvis with intravenous contrast.  Contiguous axial images of the abdomen 
and pelvis was performed from the lung bases to the symphysis pubis.

 

One of the following dose optimization techniques was utilized in the performance of this exam: Autom
ated exposure control; adjustment of the mA and/or kV according to the patient's size; or use of an i
terative  reconstruction technique.  Specific details can be referenced in the facility's radiology C
T exam operational policy.

 

CONTRAST:  75 cc of Isovue-[id number]

 

COMPARISON:  2018 as well as priors

 

FINDINGS:

 

Visualized lung bases:  Reidentified is some scarring at the left lung base and a calcified pleural g
ranuloma.

 

Hepatobiliary:  Liver is enlarged measures 22.1 cm in craniocaudal length with fatty infiltration.  T
here is some nodularity of the surface of liver suggesting some degree of cirrhosis.  Hepatic veins a
nd portal veins are patent.    Gallbladder is absent.

 

Spleen:  Negative.

 

Adrenals:  Negative.

 

Kidneys/:  Negative.

 

Pancreas:  Negative.

 

GI:  Postoperative changes are noted from gastrojejunostomy.  The stomach remnant is filled with food
.  Percutaneous drain in the right upper quadrant is once again noted.  There appear to be fistulous 
tracts extending from the tip of the drain towards the stomach and 1st portion of the duodenum essent
ially unchanged from prior.

 

Vessels/spaces/nodes:  Small amount of free fluid is noted in the pelvis tracking into the right para
colic gutter.  Small retroperitoneal lymph nodes are stable likely reactive.

 

Bones/soft tissues:  Reidentified is a ventral hernia which has been previously  repaired with mesh. 
 There are some nonobstructed small large bowel loops in the hernia.  Degenerative changes noted L5-S
1.

 

IMPRESSION:

 

1.  Reidentified are postoperative changes from a gastrojejunostomy.  The right upper quadrant drain 
is once again noted and there are fistulous tracts to the stomach and duodenum which are overall unch
anged from prior exam.

 

2.  Gastric remnant is filled with food.  No bowel obstruction or focal inflammation.

 

3.  Small amount of free fluid is noted in the deep pelvis and right paracolic gutter.

 

4.  Recurrent ventral hernia with prior mesh repair, slightly more prominent.

 

Report Dictated By: Thomas Dunphy, MD at 2019 1:07 PM

 

Report E-Signed By: Thomas Dunphy, MD  at 2019 1:20 PM

 

WSN:[id number]